# Patient Record
Sex: MALE | Race: WHITE | URBAN - METROPOLITAN AREA
[De-identification: names, ages, dates, MRNs, and addresses within clinical notes are randomized per-mention and may not be internally consistent; named-entity substitution may affect disease eponyms.]

---

## 2018-02-01 ENCOUNTER — OFFICE VISIT (OUTPATIENT)
Dept: FAMILY MEDICINE CLINIC | Facility: CLINIC | Age: 58
End: 2018-02-01

## 2018-02-01 VITALS
TEMPERATURE: 99 F | HEIGHT: 73 IN | WEIGHT: 210 LBS | OXYGEN SATURATION: 96 % | BODY MASS INDEX: 27.83 KG/M2 | RESPIRATION RATE: 20 BRPM | SYSTOLIC BLOOD PRESSURE: 114 MMHG | DIASTOLIC BLOOD PRESSURE: 70 MMHG | HEART RATE: 76 BPM

## 2018-02-01 DIAGNOSIS — J01.10 ACUTE FRONTAL SINUSITIS, RECURRENCE NOT SPECIFIED: ICD-10-CM

## 2018-02-01 DIAGNOSIS — J40 BRONCHITIS: Primary | ICD-10-CM

## 2018-02-01 PROCEDURE — 99203 OFFICE O/P NEW LOW 30 MIN: CPT | Performed by: NURSE PRACTITIONER

## 2018-02-01 RX ORDER — PREDNISONE 20 MG/1
TABLET ORAL
Qty: 12 TABLET | Refills: 0 | Status: SHIPPED | OUTPATIENT
Start: 2018-02-01

## 2018-02-01 RX ORDER — AMOXICILLIN AND CLAVULANATE POTASSIUM 875; 125 MG/1; MG/1
1 TABLET, FILM COATED ORAL 2 TIMES DAILY
Qty: 20 TABLET | Refills: 0 | Status: SHIPPED | OUTPATIENT
Start: 2018-02-01 | End: 2018-02-11

## 2018-02-01 RX ORDER — ALBUTEROL SULFATE 90 UG/1
2 AEROSOL, METERED RESPIRATORY (INHALATION) EVERY 6 HOURS PRN
Qty: 1 INHALER | Refills: 0 | Status: SHIPPED | OUTPATIENT
Start: 2018-02-01 | End: 2019-02-01

## 2018-02-01 NOTE — PROGRESS NOTES
CHIEF COMPLAINT:   Patient presents with:  Cough  Cold: sinus pressure    HPI:   Paola Plummer is a 62year old male who presents with upper respiratory symptoms for  2  weeks.  Patient reports congestion, low grade fever, cough with thick green colored s Leroy Carver is a 62year old male who presents with upper respiratory symptoms that are consistent with    ASSESSMENT:   Bronchitis  (primary encounter diagnosis)  Acute frontal sinusitis, recurrence not specified    PLAN: Meds as below.   Comfort care Bronchitis usually lasts 7 to 14 days. The wheezing should improve with treatment during the first week. An inhaler is often prescribed to relax the air passages and stop wheezing.  Antibiotics will be prescribed if your doctor thinks there is also a second Follow up with your healthcare provider, or as advised. If you had an X-ray or ECG (electrocardiogram), a specialist will review it. You will be notified of any new findings that may affect your care.   If you are age 72 or older, or if you have a chronic l

## 2018-03-30 ENCOUNTER — OFFICE VISIT (OUTPATIENT)
Dept: FAMILY MEDICINE CLINIC | Facility: CLINIC | Age: 58
End: 2018-03-30

## 2018-03-30 VITALS
DIASTOLIC BLOOD PRESSURE: 76 MMHG | BODY MASS INDEX: 29 KG/M2 | TEMPERATURE: 98 F | SYSTOLIC BLOOD PRESSURE: 118 MMHG | RESPIRATION RATE: 16 BRPM | WEIGHT: 223 LBS | OXYGEN SATURATION: 96 % | HEART RATE: 65 BPM

## 2018-03-30 DIAGNOSIS — R09.89 CHEST CONGESTION: ICD-10-CM

## 2018-03-30 DIAGNOSIS — R05.9 COUGH: Primary | ICD-10-CM

## 2018-03-30 DIAGNOSIS — R53.83 FATIGUE, UNSPECIFIED TYPE: ICD-10-CM

## 2018-03-30 PROCEDURE — 99213 OFFICE O/P EST LOW 20 MIN: CPT | Performed by: PHYSICIAN ASSISTANT

## 2018-03-30 RX ORDER — DOXYCYCLINE 100 MG/1
100 CAPSULE ORAL 2 TIMES DAILY
Qty: 20 CAPSULE | Refills: 0 | Status: SHIPPED | OUTPATIENT
Start: 2018-03-30 | End: 2018-04-09

## 2018-03-30 RX ORDER — PREDNISONE 20 MG/1
TABLET ORAL
Qty: 10 TABLET | Refills: 0 | Status: SHIPPED | OUTPATIENT
Start: 2018-03-30

## 2018-03-30 NOTE — PROGRESS NOTES
CHIEF COMPLAINT:   Patient presents with:  Cough      HPI:   Bibi Canseco is a 62year old male who presents for upper respiratory symptoms for  4-5 days, worsening in severity. Can hardly work because feels so awful. Going out of town this coming week. GENERAL: feels well otherwise,    SKIN: no rashes or abnormal skin lesions  HEENT: See HPI  LUNGS: See HPI. Denies pleuritic pain.   CARDIOVASCULAR: denies chest pain or palpitations  GI: denies N/V/C or abdominal pain  NEURO: Denies headaches    EXAM:   B Hydration encouraged, Rest encouraged  Expectations for course of illness explained  Risks, benefits, and side effects of medication explained and discussed. The patient indicates understanding of these issues and agrees to the plan.   The patient is asked Healthy Cilia: Tiny, hairlike cilia sweep mucus and particles up and out of the airways. Lings with Bronchitis  Bronchitis often occurs when a cold or the flu virus.  The airways become inflamed (red and swollen). There is a deep “hacking” cough from the · Sit up or use extra pillows when in bed to help lessen coughing and congestion. · Ask your provider about using cough medicine, pain and fever medication, or a decongestant. Antibiotics  Most cases of bronchitis are caused by cold or flu viruses.  Antib